# Patient Record
Sex: FEMALE | Race: WHITE
[De-identification: names, ages, dates, MRNs, and addresses within clinical notes are randomized per-mention and may not be internally consistent; named-entity substitution may affect disease eponyms.]

---

## 2018-11-13 ENCOUNTER — HOSPITAL ENCOUNTER (EMERGENCY)
Dept: HOSPITAL 57 - BURERS | Age: 33
Discharge: HOME | End: 2018-11-13
Payer: COMMERCIAL

## 2018-11-13 DIAGNOSIS — F32.9: ICD-10-CM

## 2018-11-13 DIAGNOSIS — J06.9: Primary | ICD-10-CM

## 2018-11-13 DIAGNOSIS — G40.909: ICD-10-CM

## 2018-11-13 DIAGNOSIS — F41.9: ICD-10-CM

## 2018-11-13 DIAGNOSIS — Z79.899: ICD-10-CM

## 2018-11-13 PROCEDURE — 99283 EMERGENCY DEPT VISIT LOW MDM: CPT

## 2018-11-13 PROCEDURE — 87804 INFLUENZA ASSAY W/OPTIC: CPT

## 2019-03-11 ENCOUNTER — HOSPITAL ENCOUNTER (EMERGENCY)
Dept: HOSPITAL 92 - ERS | Age: 34
LOS: 1 days | Discharge: HOME | End: 2019-03-12
Payer: COMMERCIAL

## 2019-03-11 ENCOUNTER — HOSPITAL ENCOUNTER (EMERGENCY)
Dept: HOSPITAL 57 - BURERS | Age: 34
Discharge: ANOTHER HOSPITAL | End: 2019-03-11
Payer: COMMERCIAL

## 2019-03-11 DIAGNOSIS — Z79.899: ICD-10-CM

## 2019-03-11 DIAGNOSIS — O99.341: ICD-10-CM

## 2019-03-11 DIAGNOSIS — G40.909: ICD-10-CM

## 2019-03-11 DIAGNOSIS — O99.89: Primary | ICD-10-CM

## 2019-03-11 DIAGNOSIS — R10.31: ICD-10-CM

## 2019-03-11 DIAGNOSIS — F32.9: ICD-10-CM

## 2019-03-11 DIAGNOSIS — Z3A.08: ICD-10-CM

## 2019-03-11 DIAGNOSIS — O99.351: ICD-10-CM

## 2019-03-11 DIAGNOSIS — F41.9: ICD-10-CM

## 2019-03-11 DIAGNOSIS — Z3A.10: ICD-10-CM

## 2019-03-11 LAB
ALBUMIN SERPL BCG-MCNC: 4 G/DL (ref 3.5–5)
ALP SERPL-CCNC: 52 U/L (ref 40–150)
ALT SERPL W P-5'-P-CCNC: 8 U/L (ref 8–55)
ANION GAP SERPL CALC-SCNC: 12 MMOL/L (ref 10–20)
APTT PPP: 30.7 SEC (ref 22.9–36.1)
AST SERPL-CCNC: 15 U/L (ref 5–34)
BACTERIA UR QL AUTO: (no result) HPF
BILIRUB SERPL-MCNC: 0.3 MG/DL (ref 0.2–1.2)
BUN SERPL-MCNC: 11 MG/DL (ref 7–18.7)
CALCIUM SERPL-MCNC: 8.9 MG/DL (ref 7.8–10.44)
CHLORIDE SERPL-SCNC: 112 MMOL/L (ref 98–107)
CO2 SERPL-SCNC: 16 MMOL/L (ref 22–29)
CREAT CL PREDICTED SERPL C-G-VRATE: 0 ML/MIN (ref 70–130)
GLOBULIN SER CALC-MCNC: 2.9 G/DL (ref 2.4–3.5)
GLUCOSE SERPL-MCNC: 87 MG/DL (ref 70–105)
HCG UR QL: POSITIVE
HGB BLD-MCNC: 13.2 G/DL (ref 12–16)
HYALINE CASTS #/AREA URNS LPF: (no result) LPF
INR PPP: 1
MCH RBC QN AUTO: 31.9 PG (ref 27–31)
MCV RBC AUTO: 95.2 FL (ref 78–98)
MDIFF COMPLETE?: YES
PLATELET # BLD AUTO: 256 THOU/UL (ref 130–400)
POTASSIUM SERPL-SCNC: 4.3 MMOL/L (ref 3.5–5.1)
PREGU CONTROL BACKGROUND?: (no result)
PREGU CONTROL BAR APPEAR?: YES
PROTHROMBIN TIME: 13.7 SEC (ref 12–14.7)
RBC # BLD AUTO: 4.12 MILL/UL (ref 4.2–5.4)
RBC UR QL AUTO: (no result) HPF (ref 0–3)
SODIUM SERPL-SCNC: 136 MMOL/L (ref 136–145)
SP GR UR STRIP: 1.02 (ref 1–1.03)
WBC # BLD AUTO: 10.9 THOU/UL (ref 4.8–10.8)
WBC UR QL AUTO: (no result) HPF (ref 0–3)

## 2019-03-11 PROCEDURE — 80053 COMPREHEN METABOLIC PANEL: CPT

## 2019-03-11 PROCEDURE — 96360 HYDRATION IV INFUSION INIT: CPT

## 2019-03-11 PROCEDURE — 87510 GARDNER VAG DNA DIR PROBE: CPT

## 2019-03-11 PROCEDURE — 74181 MRI ABDOMEN W/O CONTRAST: CPT

## 2019-03-11 PROCEDURE — 96372 THER/PROPH/DIAG INJ SC/IM: CPT

## 2019-03-11 PROCEDURE — 86900 BLOOD TYPING SEROLOGIC ABO: CPT

## 2019-03-11 PROCEDURE — 87591 N.GONORRHOEAE DNA AMP PROB: CPT

## 2019-03-11 PROCEDURE — 36415 COLL VENOUS BLD VENIPUNCTURE: CPT

## 2019-03-11 PROCEDURE — 85610 PROTHROMBIN TIME: CPT

## 2019-03-11 PROCEDURE — 85025 COMPLETE CBC W/AUTO DIFF WBC: CPT

## 2019-03-11 PROCEDURE — 85730 THROMBOPLASTIN TIME PARTIAL: CPT

## 2019-03-11 PROCEDURE — 86901 BLOOD TYPING SEROLOGIC RH(D): CPT

## 2019-03-11 PROCEDURE — 87491 CHLMYD TRACH DNA AMP PROBE: CPT

## 2019-03-11 PROCEDURE — 87660 TRICHOMONAS VAGIN DIR PROBE: CPT

## 2019-03-11 PROCEDURE — 99284 EMERGENCY DEPT VISIT MOD MDM: CPT

## 2019-03-11 PROCEDURE — 81025 URINE PREGNANCY TEST: CPT

## 2019-03-11 PROCEDURE — 84702 CHORIONIC GONADOTROPIN TEST: CPT

## 2019-03-11 PROCEDURE — 81003 URINALYSIS AUTO W/O SCOPE: CPT

## 2019-03-11 PROCEDURE — 76856 US EXAM PELVIC COMPLETE: CPT

## 2019-03-11 PROCEDURE — 87480 CANDIDA DNA DIR PROBE: CPT

## 2019-03-11 PROCEDURE — 81015 MICROSCOPIC EXAM OF URINE: CPT

## 2019-03-11 NOTE — ULT
OB ULTRASOUND:

 

History: Right lower quadrant pain. The patient is pregnant. Evaluation for ectopic. 

 

FINDINGS: 

Real-time imaging of the pelvis was performed with images obtained transabdominally. This shows an in
trauterine gestational sac and fetal pole. The fetal heart rate is 157 beats/minute. Gestational sac 
measurements are 4.5 cm corresponding to 10 weeks 0 days. Crown rump length measurements 3.7 cm corre
sponding to 10 weeks 4 day. No subchorionic bleed is identified. 

 

The right and left ovaries are normal in appearance. 

 

DOPPLER EVALUATION WITH SPECTRAL ANALYSIS:

Normal flow is shown to the adnexa. 

 

IMPRESSION: 

1. Single viable intrauterine pregnancy. Crown rump length and gestational sac measurements correspon
ding to 10 weeks 2 days with estimated date of delivery 10-5-19.

 

POS: [facility] [place]

## 2019-03-12 NOTE — MRI
NONCONTRAST MRI ABDOMEN:

 

03/11/2019

 

HISTORY:

Right lower quadrant abdominal pain with vomiting and dizziness for two days.  History of right nephr
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

 

FINDINGS:

There is a very small caliber, tubular-appearing structure seen in the region of the cecal apex, whic
h extends medially and superiorly, which is thought to be the appendix, and this is normal in caliber
.  There is no cecal apical thickening and no fluid or abnormal signal intensity is seen in the right
 lower quadrant, adjacent to the colon or in the expected location of the appendix, to suggest append
icitis.

 

The provided history is a right nephrectomy, but the right kidney is visualized.  The kidneys demonst
rate a normal nonenhanced CT appearance.

 

There is evidence of a single intrauterine gestation.

 

No free fluid or fluid collection is seen in the visualized abdomen or in the pelvis.

 

Normal signal intensity is demonstrated in the osseous structures.

 

IMPRESSION:

1.  No MRI findings to suggest appendicitis.

 

2.  No fluid or fluid collection is seen in the right lower quadrant.

 

3.  Single intrauterine gestation.

 

4.  No evidence of hydronephrosis bilaterally.

 

POS: [facility]

## 2019-07-09 ENCOUNTER — HOSPITAL ENCOUNTER (OUTPATIENT)
Dept: HOSPITAL 92 - BICCT | Age: 34
Discharge: HOME | End: 2019-07-09
Attending: UROLOGY
Payer: COMMERCIAL

## 2019-07-09 DIAGNOSIS — C64.9: Primary | ICD-10-CM

## 2019-07-09 PROCEDURE — 71046 X-RAY EXAM CHEST 2 VIEWS: CPT

## 2019-07-09 PROCEDURE — 74170 CT ABD WO CNTRST FLWD CNTRST: CPT

## 2019-07-09 NOTE — RAD
RADIOGRAPH CHEST 2 VIEWS:



DATE:

7/9/2019



HISTORY:

33-year-old female with malignant neoplasm of right kidney.



FINDINGS:

The lungs are clear. The cardiomediastinal silhouette and hilar shadows appear normal. There is no pl
eural effusion or pneumothorax. No osseous abnormality is identified.



IMPRESSION:

Normal



Reported By: Erik Gilbert 

Electronically Signed:  7/9/2019 8:40 AM

## 2019-07-09 NOTE — CT
ABDOMEN CT WITH AND WITHOUT CONTRAST 

 

Date:  07/09/19 

 

HISTORY:  Malignant neoplasm of the kidney, unspecified. Right kidney cancer 1 year ago. Patient had 
mass removed. 

 

COMPARISON:  None. 

 

CORRELATION:  Abdomen MRI dated 03/11/19. 

 

FINDINGS:

Lung bases demonstrate dependent atelectatic changes. Normal heart size. No pericardial effusion. Vis
ualized aorta has a normal caliber. Gallbladder is unremarkable. Portal vein is patent. 

 

Fatty infiltration of the liver, adjacent to the falciform ligament is noted. Spleen, pancreas, and a
drenal glands have appropriate attenuation and enhancement. 

 

No retroperitoneal mass, lymphadenopathy, or hematoma. No periaortic mass or hematoma. 

 

Slightly enlarged left hemiabdominal mesenteric lymph nodes which are nonspecific. Representative lym
ph node measures 0.6 cm maximum dimension. 

 

Limited evaluation of the alimentary canal by the lack of oral contrast. No evidence of bowel obstruc
tion. 

 

Noncontrast images do not identify any evidence of nephrolithiasis. Symmetric enhancement of the kidn
eys on the arterial phase image. There is a small defect involving the upper pole of right kidney, me
asuring 0.4 cm. There is symmetric excretion of contrast into decompressed intrarenal collecting syst
em. Visualized ureters are unremarkable. 

 

There are no lytic or blastic lesions in the osseous structures. 

 

IMPRESSION: 

No CT evidence of a left or right renal mass/neoplasm. 

 

 

 

POS: [facility]

## 2019-07-29 ENCOUNTER — HOSPITAL ENCOUNTER (EMERGENCY)
Dept: HOSPITAL 57 - BURERS | Age: 34
Discharge: HOME | End: 2019-07-29
Payer: COMMERCIAL

## 2019-07-29 DIAGNOSIS — M25.512: Primary | ICD-10-CM

## 2019-07-29 DIAGNOSIS — Z79.899: ICD-10-CM

## 2019-07-29 DIAGNOSIS — F41.9: ICD-10-CM

## 2019-07-29 DIAGNOSIS — F32.9: ICD-10-CM

## 2019-07-29 NOTE — RAD
LEFT SHOULDER THREE VIEWS:

 

07/29/2019

 

FINDINGS:

No acute fracture is seen; however, there is evidence of old trauma to the left AC joint.  There appe
ars to have been an old third-degree separation with bony overgrowth where the ligaments used to be. 
 The clavicle overrides the top of the acromion.  There is no dislocation of the humeral head.  The v
isible adjacent ribs appear intact.

 

IMPRESSION:

No acute findings, but there is change from an old third-degree acromioclavicular separation.

 

POS: [facility]

## 2019-10-26 ENCOUNTER — HOSPITAL ENCOUNTER (EMERGENCY)
Dept: HOSPITAL 57 - BURERS | Age: 34
Discharge: HOME | End: 2019-10-26
Payer: COMMERCIAL
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
## 2019-10-26 DIAGNOSIS — F32.9: ICD-10-CM

## 2019-10-26 DIAGNOSIS — Z79.899: ICD-10-CM

## 2019-10-26 DIAGNOSIS — G40.909: ICD-10-CM

## 2019-10-26 DIAGNOSIS — F41.9: ICD-10-CM

## 2019-10-26 DIAGNOSIS — S40.012A: Primary | ICD-10-CM

## 2019-10-26 NOTE — RAD
LEFT SHOULDER THREE VIEWS

10/26/19

 

Comparison is made with a 7/29/19 study. A bony density is seen beneath the distal clavicle and there
 is a large AC separation at the AC joint. This was present before so is not a new finding. The degre
e of offset, however, seems a little greater. There is no dislocation of the glenohumeral joint. The 
visible ribs appear intact. 

 

IMPRESSION: 

Prior AC joint injury with separation. The width of separation seems greater today than before. 

 

POS: [facility]

## 2020-12-11 ENCOUNTER — HOSPITAL ENCOUNTER (OUTPATIENT)
Dept: HOSPITAL 92 - BICCT | Age: 35
Discharge: HOME | End: 2020-12-11
Attending: UROLOGY
Payer: COMMERCIAL

## 2020-12-11 DIAGNOSIS — K43.9: ICD-10-CM

## 2020-12-11 DIAGNOSIS — C64.9: Primary | ICD-10-CM

## 2020-12-11 PROCEDURE — 74178 CT ABD&PLV WO CNTR FLWD CNTR: CPT

## 2020-12-11 PROCEDURE — 71046 X-RAY EXAM CHEST 2 VIEWS: CPT

## 2020-12-11 NOTE — RAD
XR Chest Pa   Lat STANDARD



HISTORY: Malignant neoplasm of kidney



COMPARISON: 7/9/2019



FINDINGS: The heart size is normal. The lungs are well expanded without focal areas of consolidation,
 pneumothorax or pleural effusions.



IMPRESSION: No radiographic evidence of acute cardiopulmonary process.



Reported By: Jose Morin 

Electronically Signed:  12/11/2020 9:59 AM

## 2020-12-11 NOTE — CT
CT OF THE ABDOMEN AND PELVIS WITHOUT AND WITH CONTRAST:

 

COMPARISON: 

7/9/2019 and MRI abdomen 3/11/2019.

 

HISTORY: 

Kidney cancer in 2017 status post right partial nephrectomy.  Right lower quadrant abdominal pain.

 

TECHNIQUE: 

Multiple contiguous axial images were obtained in a CT of the abdomen and pelvis without and with IV 
contrast.  Post contrast images were obtained in the nephrographic and expiratory phases.  Sagittal a
nd coronal reformats were performed.

 

FINDINGS: 

The liver, gallbladder, kidneys, adrenal glands, spleen, and pancreas are unremarkable.  Both renal c
ollecting systems and ureters are unremarkable without focal abnormality.  The urinary bladder is unr
emarkable. 

 

The large and small bowel are unremarkable.  The appendix is normal.  The reproductive organs are unr
emarkable.  No free air, free fluid, or stranding changes are seen in the abdomen or pelvis.

 

There is a defect in the anterior abdominal wall and chest lateral to the right rectus abdominus musc
le consistent with a spigelian hernia.  This measures 5.1 cm in size.  This is predominantly contains
 fat, but a loop of bowel is seen in the posterior most aspect of this hernia.  No abdominal or pelvi
c lymphadenopathy are seen.

 

The osseous structures and visualized inferior thorax are unremarkable.

 

IMPRESSION: 

1.  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

 

2.  No evidence of recurrent right renal mass.

 

POS: [facility]

## 2021-03-04 ENCOUNTER — HOSPITAL ENCOUNTER (OUTPATIENT)
Dept: HOSPITAL 92 - LABBT | Age: 36
Discharge: HOME | End: 2021-03-04
Attending: SURGERY
Payer: COMMERCIAL

## 2021-03-04 DIAGNOSIS — Z01.812: Primary | ICD-10-CM

## 2021-03-04 DIAGNOSIS — Z20.822: ICD-10-CM

## 2021-03-04 DIAGNOSIS — K43.2: ICD-10-CM

## 2021-03-04 LAB
ANION GAP SERPL CALC-SCNC: 11 MMOL/L (ref 10–20)
BASOPHILS # BLD AUTO: 0 10X3/UL (ref 0–0.2)
BASOPHILS NFR BLD AUTO: 0.4 % (ref 0–2)
BUN SERPL-MCNC: 11 MG/DL (ref 7–18.7)
CALCIUM SERPL-MCNC: 8.9 MG/DL (ref 7.8–10.44)
CHLORIDE SERPL-SCNC: 113 MMOL/L (ref 98–107)
CO2 SERPL-SCNC: 21 MMOL/L (ref 22–29)
CREAT CL PREDICTED SERPL C-G-VRATE: 0 ML/MIN (ref 70–130)
EOSINOPHIL # BLD AUTO: 0.1 10X3/UL (ref 0–0.5)
EOSINOPHIL NFR BLD AUTO: 1.5 % (ref 0–6)
GLUCOSE SERPL-MCNC: 92 MG/DL (ref 70–105)
HGB BLD-MCNC: 12.9 G/DL (ref 12–15.5)
LYMPHOCYTES NFR BLD AUTO: 28 % (ref 18–47)
MCH RBC QN AUTO: 30.3 PG (ref 27–33)
MCV RBC AUTO: 93.9 FL (ref 81.6–98.3)
MONOCYTES # BLD AUTO: 0.6 10X3/UL (ref 0–1.1)
MONOCYTES NFR BLD AUTO: 5.8 % (ref 0–10)
NEUTROPHILS # BLD AUTO: 6.1 10X3/UL (ref 1.5–8.4)
NEUTROPHILS NFR BLD AUTO: 64 % (ref 40–75)
PLATELET # BLD AUTO: 283 10X3/UL (ref 150–450)
POTASSIUM SERPL-SCNC: 3.8 MMOL/L (ref 3.5–5.1)
PREGS CONTROL BACKGROUND?: (no result)
PREGS CONTROL BAR APPEAR?: YES
RBC # BLD AUTO: 4.26 10X6/UL (ref 3.9–5.03)
SODIUM SERPL-SCNC: 141 MMOL/L (ref 136–145)
WBC # BLD AUTO: 9.5 10X3/UL (ref 3.5–10.5)

## 2021-03-04 PROCEDURE — 87635 SARS-COV-2 COVID-19 AMP PRB: CPT

## 2021-03-04 PROCEDURE — U0005 INFEC AGEN DETEC AMPLI PROBE: HCPCS

## 2021-03-04 PROCEDURE — U0003 INFECTIOUS AGENT DETECTION BY NUCLEIC ACID (DNA OR RNA); SEVERE ACUTE RESPIRATORY SYNDROME CORONAVIRUS 2 (SARS-COV-2) (CORONAVIRUS DISEASE [COVID-19]), AMPLIFIED PROBE TECHNIQUE, MAKING USE OF HIGH THROUGHPUT TECHNOLOGIES AS DESCRIBED BY CMS-2020-01-R: HCPCS

## 2021-03-04 PROCEDURE — 84703 CHORIONIC GONADOTROPIN ASSAY: CPT

## 2021-03-04 PROCEDURE — 85025 COMPLETE CBC W/AUTO DIFF WBC: CPT

## 2021-03-04 PROCEDURE — 80048 BASIC METABOLIC PNL TOTAL CA: CPT

## 2021-03-08 ENCOUNTER — HOSPITAL ENCOUNTER (OUTPATIENT)
Dept: HOSPITAL 92 - SDC | Age: 36
Discharge: HOME | End: 2021-03-08
Attending: SURGERY
Payer: COMMERCIAL

## 2021-03-08 VITALS — BODY MASS INDEX: 29.1 KG/M2

## 2021-03-08 DIAGNOSIS — Z79.899: ICD-10-CM

## 2021-03-08 DIAGNOSIS — Z88.0: ICD-10-CM

## 2021-03-08 DIAGNOSIS — K43.2: Primary | ICD-10-CM

## 2021-03-08 PROCEDURE — 0WUF4JZ SUPPLEMENT ABDOMINAL WALL WITH SYNTHETIC SUBSTITUTE, PERCUTANEOUS ENDOSCOPIC APPROACH: ICD-10-PCS | Performed by: SURGERY

## 2021-03-08 PROCEDURE — S0020 INJECTION, BUPIVICAINE HYDRO: HCPCS

## 2023-02-19 ENCOUNTER — HOSPITAL ENCOUNTER (INPATIENT)
Dept: HOSPITAL 92 - NEURO | Age: 38
LOS: 2 days | Discharge: HOME | DRG: 101 | End: 2023-02-21
Attending: FAMILY MEDICINE | Admitting: FAMILY MEDICINE
Payer: COMMERCIAL

## 2023-02-19 VITALS — BODY MASS INDEX: 30.5 KG/M2

## 2023-02-19 DIAGNOSIS — Z90.5: ICD-10-CM

## 2023-02-19 DIAGNOSIS — F32.A: ICD-10-CM

## 2023-02-19 DIAGNOSIS — Z85.528: ICD-10-CM

## 2023-02-19 DIAGNOSIS — R56.9: Primary | ICD-10-CM

## 2023-02-19 DIAGNOSIS — Z88.0: ICD-10-CM

## 2023-02-19 DIAGNOSIS — Z20.822: ICD-10-CM

## 2023-02-19 DIAGNOSIS — Z98.890: ICD-10-CM

## 2023-02-19 DIAGNOSIS — Z79.899: ICD-10-CM

## 2023-02-19 DIAGNOSIS — F41.9: ICD-10-CM

## 2023-02-19 PROCEDURE — 80076 HEPATIC FUNCTION PANEL: CPT

## 2023-02-19 PROCEDURE — 83735 ASSAY OF MAGNESIUM: CPT

## 2023-02-19 PROCEDURE — 80048 BASIC METABOLIC PNL TOTAL CA: CPT

## 2023-02-19 PROCEDURE — U0003 INFECTIOUS AGENT DETECTION BY NUCLEIC ACID (DNA OR RNA); SEVERE ACUTE RESPIRATORY SYNDROME CORONAVIRUS 2 (SARS-COV-2) (CORONAVIRUS DISEASE [COVID-19]), AMPLIFIED PROBE TECHNIQUE, MAKING USE OF HIGH THROUGHPUT TECHNOLOGIES AS DESCRIBED BY CMS-2020-01-R: HCPCS

## 2023-02-19 PROCEDURE — U0005 INFEC AGEN DETEC AMPLI PROBE: HCPCS

## 2023-02-19 PROCEDURE — 95712 VEEG 2-12 HR INTMT MNTR: CPT

## 2023-02-19 PROCEDURE — 95957 EEG DIGITAL ANALYSIS: CPT

## 2023-02-19 PROCEDURE — 94760 N-INVAS EAR/PLS OXIMETRY 1: CPT

## 2023-02-19 PROCEDURE — 85025 COMPLETE CBC W/AUTO DIFF WBC: CPT

## 2023-02-19 PROCEDURE — 36415 COLL VENOUS BLD VENIPUNCTURE: CPT

## 2023-02-19 PROCEDURE — 84484 ASSAY OF TROPONIN QUANT: CPT

## 2023-02-19 PROCEDURE — G0378 HOSPITAL OBSERVATION PER HR: HCPCS

## 2023-02-19 PROCEDURE — 93306 TTE W/DOPPLER COMPLETE: CPT

## 2023-02-19 PROCEDURE — 95819 EEG AWAKE AND ASLEEP: CPT

## 2023-02-20 LAB
ANION GAP SERPL CALC-SCNC: 5 MMOL/L (ref 10–20)
BUN SERPL-MCNC: 9 MG/DL (ref 7–18.7)
CALCIUM SERPL-MCNC: 8.6 MG/DL (ref 7.8–10.44)
CHLORIDE SERPL-SCNC: 120 MMOL/L (ref 98–107)
CO2 SERPL-SCNC: 17 MMOL/L (ref 22–29)
CREAT CL PREDICTED SERPL C-G-VRATE: 164 ML/MIN (ref 70–130)
GLUCOSE SERPL-MCNC: 97 MG/DL (ref 70–105)
MAGNESIUM SERPL-MCNC: 1.9 MG/DL (ref 1.6–2.6)
POTASSIUM SERPL-SCNC: 3.9 MMOL/L (ref 3.5–5.1)
SODIUM SERPL-SCNC: 138 MMOL/L (ref 136–145)
TROPONIN I SERPL DL<=0.01 NG/ML-MCNC: (no result) NG/ML (ref ?–0.03)
TROPONIN I SERPL DL<=0.01 NG/ML-MCNC: (no result) NG/ML (ref ?–0.03)

## 2023-02-20 RX ADMIN — Medication SCH UNITS: at 08:35

## 2023-02-20 RX ADMIN — Medication SCH ML: at 21:27

## 2023-02-20 RX ADMIN — ASPIRIN 81 MG CHEWABLE TABLET SCH MG: 81 TABLET CHEWABLE at 08:35

## 2023-02-20 RX ADMIN — Medication SCH MG: at 08:35

## 2023-02-20 RX ADMIN — Medication SCH ML: at 08:35

## 2023-02-21 VITALS — TEMPERATURE: 97.4 F

## 2023-02-21 VITALS — DIASTOLIC BLOOD PRESSURE: 57 MMHG | SYSTOLIC BLOOD PRESSURE: 120 MMHG

## 2023-02-21 LAB
ALBUMIN SERPL BCG-MCNC: 3.8 G/DL (ref 3.5–5)
ALP SERPL-CCNC: 62 U/L (ref 40–110)
ALT SERPL W P-5'-P-CCNC: (no result) U/L (ref 8–55)
ANION GAP SERPL CALC-SCNC: 13 MMOL/L (ref 10–20)
AST SERPL-CCNC: 10 U/L (ref 5–34)
BASOPHILS # BLD AUTO: 0.1 THOU/UL (ref 0–0.2)
BASOPHILS NFR BLD AUTO: 0.7 % (ref 0–1)
BILIRUB DIRECT SERPL-MCNC: 0.1 MG/DL (ref 0.1–0.3)
BILIRUB SERPL-MCNC: 0.3 MG/DL (ref 0.2–1.2)
BUN SERPL-MCNC: 11 MG/DL (ref 7–18.7)
CALCIUM SERPL-MCNC: 9 MG/DL (ref 7.8–10.44)
CHLORIDE SERPL-SCNC: 114 MMOL/L (ref 98–107)
CO2 SERPL-SCNC: 16 MMOL/L (ref 22–29)
CREAT CL PREDICTED SERPL C-G-VRATE: 164 ML/MIN (ref 70–130)
EOSINOPHIL # BLD AUTO: 0.1 THOU/UL (ref 0–0.7)
EOSINOPHIL NFR BLD AUTO: 1.6 % (ref 0–10)
GLUCOSE SERPL-MCNC: 86 MG/DL (ref 70–105)
HGB BLD-MCNC: 14.5 G/DL (ref 12–16)
LYMPHOCYTES # BLD: 3.1 THOU/UL (ref 1.2–3.4)
LYMPHOCYTES NFR BLD AUTO: 36.7 % (ref 21–51)
MAGNESIUM SERPL-MCNC: 1.9 MG/DL (ref 1.6–2.6)
MCH RBC QN AUTO: 32.1 PG (ref 27–31)
MCV RBC AUTO: 96.4 FL (ref 78–98)
MONOCYTES # BLD AUTO: 0.5 THOU/UL (ref 0.11–0.59)
MONOCYTES NFR BLD AUTO: 5.6 % (ref 0–10)
NEUTROPHILS # BLD AUTO: 4.6 THOU/UL (ref 1.4–6.5)
NEUTROPHILS NFR BLD AUTO: 55.3 % (ref 42–75)
PLATELET # BLD AUTO: 279 10X3/UL (ref 130–400)
POTASSIUM SERPL-SCNC: 3.7 MMOL/L (ref 3.5–5.1)
RBC # BLD AUTO: 4.52 MILL/UL (ref 4.2–5.4)
SODIUM SERPL-SCNC: 139 MMOL/L (ref 136–145)
WBC # BLD AUTO: 8.4 10X3/UL (ref 4.8–10.8)

## 2023-02-21 RX ADMIN — Medication SCH MG: at 08:40

## 2023-02-21 RX ADMIN — ASPIRIN 81 MG CHEWABLE TABLET SCH MG: 81 TABLET CHEWABLE at 08:39

## 2023-02-21 RX ADMIN — Medication SCH ML: at 08:41

## 2023-02-21 RX ADMIN — Medication SCH UNITS: at 08:39

## 2023-10-19 ENCOUNTER — HOSPITAL ENCOUNTER (OUTPATIENT)
Dept: HOSPITAL 92 - CSHLD/OP | Age: 38
Discharge: HOME | End: 2023-10-19
Attending: OBSTETRICS & GYNECOLOGY
Payer: COMMERCIAL

## 2023-10-19 VITALS — BODY MASS INDEX: 36.2 KG/M2

## 2023-10-19 DIAGNOSIS — O99.353: Primary | ICD-10-CM

## 2023-10-19 DIAGNOSIS — R42: ICD-10-CM

## 2023-10-19 DIAGNOSIS — O21.2: ICD-10-CM

## 2023-10-19 DIAGNOSIS — Z3A.34: ICD-10-CM

## 2023-10-19 DIAGNOSIS — Z88.0: ICD-10-CM

## 2023-10-19 LAB
ALBUMIN SERPL BCG-MCNC: 3.3 G/DL (ref 3.5–5)
ALP SERPL-CCNC: 96 U/L (ref 40–110)
ALT SERPL W P-5'-P-CCNC: 10 U/L (ref 8–55)
ANION GAP SERPL CALC-SCNC: 13 MMOL/L (ref 10–20)
AST SERPL-CCNC: 14 U/L (ref 5–34)
BACTERIA UR QL AUTO: (no result) HPF
BASOPHILS # BLD AUTO: 0.1 10X3/UL (ref 0–0.2)
BASOPHILS NFR BLD AUTO: 0.4 % (ref 0–2)
BILIRUB SERPL-MCNC: 0.4 MG/DL (ref 0.2–1.2)
BUN SERPL-MCNC: 9 MG/DL (ref 7–18.7)
CALCIUM SERPL-MCNC: 8.6 MG/DL (ref 7.8–10.44)
CHLORIDE SERPL-SCNC: 105 MMOL/L (ref 98–107)
CO2 SERPL-SCNC: 19 MMOL/L (ref 22–29)
CREAT CL PREDICTED SERPL C-G-VRATE: 200 ML/MIN (ref 70–130)
EOSINOPHIL # BLD AUTO: 0.2 10X3/UL (ref 0–0.5)
EOSINOPHIL NFR BLD AUTO: 1 % (ref 0–6)
GLOBULIN SER CALC-MCNC: 3 G/DL (ref 2.4–3.5)
GLUCOSE SERPL-MCNC: 95 MG/DL (ref 70–105)
HCT VFR BLD CALC: 39.2 % (ref 34.9–44.5)
HGB BLD-MCNC: 13.1 G/DL (ref 12–15.5)
LYMPHOCYTES NFR BLD AUTO: 15.9 % (ref 18–47)
MCH RBC QN AUTO: 31 PG (ref 27–33)
MCV RBC AUTO: 92.9 FL (ref 81.6–98.3)
MONOCYTES # BLD AUTO: 0.5 10X3/UL (ref 0–1.1)
MONOCYTES NFR BLD AUTO: 3.2 % (ref 0–10)
NEUTROPHILS # BLD AUTO: 12.7 10X3/UL (ref 1.5–8.4)
NEUTROPHILS NFR BLD AUTO: 78.4 % (ref 40–75)
PLATELET # BLD AUTO: 291 10X3/UL (ref 150–450)
POTASSIUM SERPL-SCNC: 3.6 MMOL/L (ref 3.5–5.1)
PROT UR STRIP.AUTO-MCNC: 15 MG/DL
RBC # BLD AUTO: 4.22 10X6/UL (ref 3.9–5.03)
RBC UR QL AUTO: (no result) HPF (ref 0–3)
SODIUM SERPL-SCNC: 133 MMOL/L (ref 136–145)
SP GR UR STRIP: 1.01 (ref 1–1.03)
WBC # BLD AUTO: 16.1 10X3/UL (ref 3.5–10.5)
WBC UR QL AUTO: (no result) HPF (ref 0–3)

## 2023-10-19 PROCEDURE — 36415 COLL VENOUS BLD VENIPUNCTURE: CPT

## 2023-10-19 PROCEDURE — 81001 URINALYSIS AUTO W/SCOPE: CPT

## 2023-10-19 PROCEDURE — 76819 FETAL BIOPHYS PROFIL W/O NST: CPT

## 2023-10-19 PROCEDURE — 85025 COMPLETE CBC W/AUTO DIFF WBC: CPT

## 2023-10-19 PROCEDURE — 87086 URINE CULTURE/COLONY COUNT: CPT

## 2023-10-19 PROCEDURE — 80053 COMPREHEN METABOLIC PANEL: CPT

## 2023-10-23 ENCOUNTER — HOSPITAL ENCOUNTER (OUTPATIENT)
Dept: HOSPITAL 92 - CSHERS | Age: 38
Discharge: HOME | End: 2023-10-23
Attending: OBSTETRICS & GYNECOLOGY
Payer: COMMERCIAL

## 2023-10-23 VITALS — BODY MASS INDEX: 34.8 KG/M2

## 2023-10-23 DIAGNOSIS — Z3A.35: ICD-10-CM

## 2023-10-23 DIAGNOSIS — O47.03: Primary | ICD-10-CM

## 2023-10-23 DIAGNOSIS — Z88.0: ICD-10-CM

## 2023-10-23 PROCEDURE — 99282 EMERGENCY DEPT VISIT SF MDM: CPT

## 2023-10-23 PROCEDURE — 93005 ELECTROCARDIOGRAM TRACING: CPT

## 2023-11-20 ENCOUNTER — HOSPITAL ENCOUNTER (INPATIENT)
Dept: HOSPITAL 92 - CSHLD/OP | Age: 38
LOS: 2 days | Discharge: HOME | End: 2023-11-22
Attending: OBSTETRICS & GYNECOLOGY | Admitting: OBSTETRICS & GYNECOLOGY
Payer: COMMERCIAL

## 2023-11-20 VITALS — BODY MASS INDEX: 33.9 KG/M2

## 2023-11-20 DIAGNOSIS — O34.03: ICD-10-CM

## 2023-11-20 DIAGNOSIS — Z88.0: ICD-10-CM

## 2023-11-20 DIAGNOSIS — Z3A.39: ICD-10-CM

## 2023-11-20 DIAGNOSIS — Q50.6: ICD-10-CM

## 2023-11-20 DIAGNOSIS — O34.211: Primary | ICD-10-CM

## 2023-11-20 DIAGNOSIS — Z85.528: ICD-10-CM

## 2023-11-20 DIAGNOSIS — Z98.890: ICD-10-CM

## 2023-11-20 DIAGNOSIS — Q51.4: ICD-10-CM

## 2023-11-20 DIAGNOSIS — Q51.28: ICD-10-CM

## 2023-11-20 LAB
HBSAG INDEX: 0.19 S/CO (ref 0–0.99)
HCT VFR BLD CALC: 42.3 % (ref 34.9–44.5)
HGB BLD-MCNC: 14.6 G/DL (ref 12–15.5)
MCH RBC QN AUTO: 31.5 PG (ref 27–33)
MCV RBC AUTO: 91.4 FL (ref 81.6–98.3)
PLATELET # BLD AUTO: 293 10X3/UL (ref 150–450)
RBC # BLD AUTO: 4.63 10X6/UL (ref 3.9–5.03)
SYPHILIS ANTIBODY INDEX: 0.09 S/CO
WBC # BLD AUTO: 14.1 10X3/UL (ref 3.5–10.5)

## 2023-11-20 PROCEDURE — 88302 TISSUE EXAM BY PATHOLOGIST: CPT

## 2023-11-20 PROCEDURE — 86780 TREPONEMA PALLIDUM: CPT

## 2023-11-20 PROCEDURE — 86900 BLOOD TYPING SEROLOGIC ABO: CPT

## 2023-11-20 PROCEDURE — 51702 INSERT TEMP BLADDER CATH: CPT

## 2023-11-20 PROCEDURE — 36415 COLL VENOUS BLD VENIPUNCTURE: CPT

## 2023-11-20 PROCEDURE — 85027 COMPLETE CBC AUTOMATED: CPT

## 2023-11-20 PROCEDURE — 86901 BLOOD TYPING SEROLOGIC RH(D): CPT

## 2023-11-20 PROCEDURE — 86850 RBC ANTIBODY SCREEN: CPT

## 2023-11-20 PROCEDURE — 87340 HEPATITIS B SURFACE AG IA: CPT

## 2023-11-20 PROCEDURE — 84112 EVAL AMNIOTIC FLUID PROTEIN: CPT

## 2023-11-20 PROCEDURE — S0028 INJECTION, FAMOTIDINE, 20 MG: HCPCS

## 2023-11-20 PROCEDURE — 0UT70ZZ RESECTION OF BILATERAL FALLOPIAN TUBES, OPEN APPROACH: ICD-10-PCS | Performed by: OBSTETRICS & GYNECOLOGY

## 2023-11-20 PROCEDURE — 99285 EMERGENCY DEPT VISIT HI MDM: CPT

## 2023-11-21 LAB
HCT VFR BLD CALC: 32.2 % (ref 34.9–44.5)
HGB BLD-MCNC: 11.1 G/DL (ref 12–15.5)
MCH RBC QN AUTO: 32 PG (ref 27–33)
MCV RBC AUTO: 92.8 FL (ref 81.6–98.3)
PLATELET # BLD AUTO: 230 10X3/UL (ref 150–450)
RBC # BLD AUTO: 3.47 10X6/UL (ref 3.9–5.03)
WBC # BLD AUTO: 14.6 10X3/UL (ref 3.5–10.5)

## 2023-11-21 RX ADMIN — HYDROCODONE BITARTRATE AND ACETAMINOPHEN PRN TAB: 5; 325 TABLET ORAL at 20:30

## 2023-11-21 RX ADMIN — HYDROCODONE BITARTRATE AND ACETAMINOPHEN PRN TAB: 5; 325 TABLET ORAL at 16:22

## 2023-11-21 RX ADMIN — HYDROCODONE BITARTRATE AND ACETAMINOPHEN PRN TAB: 5; 325 TABLET ORAL at 10:17

## 2023-11-22 VITALS — SYSTOLIC BLOOD PRESSURE: 122 MMHG | DIASTOLIC BLOOD PRESSURE: 67 MMHG | TEMPERATURE: 98.6 F

## 2023-11-22 RX ADMIN — HYDROCODONE BITARTRATE AND ACETAMINOPHEN PRN TAB: 5; 325 TABLET ORAL at 02:35

## 2023-11-22 RX ADMIN — HYDROCODONE BITARTRATE AND ACETAMINOPHEN PRN TAB: 5; 325 TABLET ORAL at 13:50

## 2023-11-22 RX ADMIN — HYDROCODONE BITARTRATE AND ACETAMINOPHEN PRN TAB: 5; 325 TABLET ORAL at 09:23
